# Patient Record
Sex: FEMALE | Race: OTHER | Employment: FULL TIME | URBAN - METROPOLITAN AREA
[De-identification: names, ages, dates, MRNs, and addresses within clinical notes are randomized per-mention and may not be internally consistent; named-entity substitution may affect disease eponyms.]

---

## 2023-02-27 ENCOUNTER — HOSPITAL ENCOUNTER (OUTPATIENT)
Dept: GENERAL RADIOLOGY | Age: 48
Discharge: HOME | End: 2023-03-01
Payer: COMMERCIAL

## 2023-02-27 DIAGNOSIS — N64.4 BREAST PAIN: ICD-10-CM

## 2023-02-27 PROCEDURE — 77066 DX MAMMO INCL CAD BI: CPT

## 2023-07-19 ENCOUNTER — OFFICE VISIT (OUTPATIENT)
Age: 48
End: 2023-07-19
Payer: COMMERCIAL

## 2023-07-19 VITALS
SYSTOLIC BLOOD PRESSURE: 132 MMHG | DIASTOLIC BLOOD PRESSURE: 88 MMHG | BODY MASS INDEX: 39.26 KG/M2 | HEART RATE: 81 BPM | HEIGHT: 57 IN | OXYGEN SATURATION: 97 % | WEIGHT: 182 LBS | TEMPERATURE: 97.3 F | RESPIRATION RATE: 16 BRPM

## 2023-07-19 DIAGNOSIS — Z11.4 SCREENING FOR HIV WITHOUT PRESENCE OF RISK FACTORS: Primary | ICD-10-CM

## 2023-07-19 DIAGNOSIS — Z11.59 NEED FOR HEPATITIS C SCREENING TEST: ICD-10-CM

## 2023-07-19 DIAGNOSIS — Z00.00 HEALTHCARE MAINTENANCE: ICD-10-CM

## 2023-07-19 DIAGNOSIS — M25.511 CHRONIC RIGHT SHOULDER PAIN: ICD-10-CM

## 2023-07-19 DIAGNOSIS — E78.2 MIXED HYPERLIPIDEMIA: ICD-10-CM

## 2023-07-19 DIAGNOSIS — M54.12 CERVICAL RADICULOPATHY: ICD-10-CM

## 2023-07-19 DIAGNOSIS — Z11.4 SCREENING FOR HIV WITHOUT PRESENCE OF RISK FACTORS: ICD-10-CM

## 2023-07-19 DIAGNOSIS — M65.4 DE QUERVAIN'S TENOSYNOVITIS, RIGHT: ICD-10-CM

## 2023-07-19 DIAGNOSIS — G89.29 CHRONIC RIGHT SHOULDER PAIN: ICD-10-CM

## 2023-07-19 DIAGNOSIS — I10 PRIMARY HYPERTENSION: ICD-10-CM

## 2023-07-19 LAB
ABSOLUTE IMMATURE GRANULOCYTE: 0.03 K/UL (ref 0–0.58)
ALBUMIN SERPL-MCNC: 3.9 G/DL (ref 3.5–5.2)
ALP BLD-CCNC: 95 U/L (ref 35–104)
ALT SERPL-CCNC: 7 U/L (ref 0–32)
ANION GAP SERPL CALCULATED.3IONS-SCNC: 13 MMOL/L (ref 7–16)
AST SERPL-CCNC: 14 U/L (ref 0–31)
BASOPHILS ABSOLUTE: 0.05 K/UL (ref 0–0.2)
BASOPHILS RELATIVE PERCENT: 1 % (ref 0–2)
BILIRUB SERPL-MCNC: 0.3 MG/DL (ref 0–1.2)
BUN BLDV-MCNC: 14 MG/DL (ref 6–20)
CALCIUM SERPL-MCNC: 9 MG/DL (ref 8.6–10.2)
CHLORIDE BLD-SCNC: 107 MMOL/L (ref 98–107)
CHOLESTEROL: 153 MG/DL
CO2: 23 MMOL/L (ref 22–29)
CREAT SERPL-MCNC: 0.7 MG/DL (ref 0.5–1)
EOSINOPHILS ABSOLUTE: 0.24 K/UL (ref 0.05–0.5)
EOSINOPHILS RELATIVE PERCENT: 2 % (ref 0–6)
GFR SERPL CREATININE-BSD FRML MDRD: >60 ML/MIN/1.73M2
GLUCOSE BLD-MCNC: 90 MG/DL (ref 74–99)
HCT VFR BLD CALC: 41.4 % (ref 34–48)
HDLC SERPL-MCNC: 52 MG/DL
HEMOGLOBIN: 12.5 G/DL (ref 11.5–15.5)
IMMATURE GRANULOCYTES: 0 % (ref 0–5)
LDL CHOLESTEROL: 85 MG/DL
LYMPHOCYTES ABSOLUTE: 2.79 K/UL (ref 1.5–4)
LYMPHOCYTES RELATIVE PERCENT: 27 % (ref 20–42)
MCH RBC QN AUTO: 29.5 PG (ref 26–35)
MCHC RBC AUTO-ENTMCNC: 30.2 G/DL (ref 32–34.5)
MCV RBC AUTO: 97.6 FL (ref 80–99.9)
MONOCYTES ABSOLUTE: 0.51 K/UL (ref 0.1–0.95)
MONOCYTES RELATIVE PERCENT: 5 % (ref 2–12)
NEUTROPHILS ABSOLUTE: 6.59 K/UL (ref 1.8–7.3)
NEUTROPHILS RELATIVE PERCENT: 65 % (ref 43–80)
PDW BLD-RTO: 13.2 % (ref 11.5–15)
PLATELET # BLD: 368 K/UL (ref 130–450)
PMV BLD AUTO: 11 FL (ref 7–12)
POTASSIUM SERPL-SCNC: 3.7 MMOL/L (ref 3.5–5)
RBC # BLD: 4.24 M/UL (ref 3.5–5.5)
SODIUM BLD-SCNC: 143 MMOL/L (ref 132–146)
TOTAL PROTEIN: 7.8 G/DL (ref 6.4–8.3)
TRIGL SERPL-MCNC: 79 MG/DL
VLDLC SERPL CALC-MCNC: 16 MG/DL
WBC # BLD: 10.2 K/UL (ref 4.5–11.5)

## 2023-07-19 PROCEDURE — 36415 COLL VENOUS BLD VENIPUNCTURE: CPT | Performed by: FAMILY MEDICINE

## 2023-07-19 PROCEDURE — 1036F TOBACCO NON-USER: CPT | Performed by: FAMILY MEDICINE

## 2023-07-19 PROCEDURE — 3075F SYST BP GE 130 - 139MM HG: CPT | Performed by: FAMILY MEDICINE

## 2023-07-19 PROCEDURE — G8427 DOCREV CUR MEDS BY ELIG CLIN: HCPCS | Performed by: FAMILY MEDICINE

## 2023-07-19 PROCEDURE — G8417 CALC BMI ABV UP PARAM F/U: HCPCS | Performed by: FAMILY MEDICINE

## 2023-07-19 PROCEDURE — 3079F DIAST BP 80-89 MM HG: CPT | Performed by: FAMILY MEDICINE

## 2023-07-19 PROCEDURE — 99204 OFFICE O/P NEW MOD 45 MIN: CPT | Performed by: FAMILY MEDICINE

## 2023-07-19 RX ORDER — LISINOPRIL 20 MG/1
20 TABLET ORAL DAILY
Qty: 90 TABLET | Refills: 1 | Status: SHIPPED | OUTPATIENT
Start: 2023-07-19

## 2023-07-19 RX ORDER — AMLODIPINE BESYLATE 2.5 MG/1
2.5 TABLET ORAL DAILY
COMMUNITY
Start: 2023-07-13

## 2023-07-19 RX ORDER — BUDESONIDE AND FORMOTEROL FUMARATE DIHYDRATE 160; 4.5 UG/1; UG/1
AEROSOL RESPIRATORY (INHALATION)
COMMUNITY
Start: 2023-07-14

## 2023-07-19 RX ORDER — ERGOCALCIFEROL 1.25 MG/1
50000 CAPSULE ORAL WEEKLY
COMMUNITY
Start: 2023-06-15

## 2023-07-19 RX ORDER — LISINOPRIL 20 MG/1
20 TABLET ORAL DAILY
COMMUNITY
Start: 2023-04-30 | End: 2023-07-19 | Stop reason: SDUPTHER

## 2023-07-19 SDOH — ECONOMIC STABILITY: FOOD INSECURITY: WITHIN THE PAST 12 MONTHS, THE FOOD YOU BOUGHT JUST DIDN'T LAST AND YOU DIDN'T HAVE MONEY TO GET MORE.: NEVER TRUE

## 2023-07-19 SDOH — ECONOMIC STABILITY: FOOD INSECURITY: WITHIN THE PAST 12 MONTHS, YOU WORRIED THAT YOUR FOOD WOULD RUN OUT BEFORE YOU GOT MONEY TO BUY MORE.: NEVER TRUE

## 2023-07-19 SDOH — ECONOMIC STABILITY: INCOME INSECURITY: HOW HARD IS IT FOR YOU TO PAY FOR THE VERY BASICS LIKE FOOD, HOUSING, MEDICAL CARE, AND HEATING?: NOT VERY HARD

## 2023-07-19 SDOH — ECONOMIC STABILITY: HOUSING INSECURITY
IN THE LAST 12 MONTHS, WAS THERE A TIME WHEN YOU DID NOT HAVE A STEADY PLACE TO SLEEP OR SLEPT IN A SHELTER (INCLUDING NOW)?: NO

## 2023-07-19 ASSESSMENT — ENCOUNTER SYMPTOMS
COUGH: 0
ABDOMINAL PAIN: 0
WHEEZING: 0
DIARRHEA: 0
VOMITING: 0
NAUSEA: 0
SHORTNESS OF BREATH: 0
CONSTIPATION: 0

## 2023-07-19 ASSESSMENT — PATIENT HEALTH QUESTIONNAIRE - PHQ9
SUM OF ALL RESPONSES TO PHQ QUESTIONS 1-9: 0
2. FEELING DOWN, DEPRESSED OR HOPELESS: 0
1. LITTLE INTEREST OR PLEASURE IN DOING THINGS: 0
SUM OF ALL RESPONSES TO PHQ QUESTIONS 1-9: 0
SUM OF ALL RESPONSES TO PHQ9 QUESTIONS 1 & 2: 0

## 2023-07-19 NOTE — PROGRESS NOTES
[facility] Primary Care  DATE OF VISIT : 2023    Patient : Jacque Bagley   Age : 52 y.o.  : 1975   MRN : [id number]   ______________________________________________________________________    Chief Complaint :   Chief Complaint   Patient presents with    New Patient     Patient presents today as a new patient. Nodule     Patient presents with a nodule on her right wrist that she states she has been dealing with since [place]. HPI : Marcelino Spaulding is 52 y.o. female who presented to the clinic today for new patient encounter. HTN: Lisinopril 20mg daily. Was given Norvasc 2.5 before for an elevated diastolic BP, she had an episode of hypotension with palpitations. Has not taken the Norvasc since. Asthma: Symbicort 160-4.5mg daily, albuterol as needed. Using her albuterol less than once monthly. Gastritis:  Protinix 40mg daily. EGD done by GI. Evonne tenosynovitis: Has been ongoing for years. Importantly because she had done some physical therapy with some relief. They had discussed injections in the past but then she moved. She does worked in maintenance for many years and now works at a [employer] using her wrists consistently. Right shoulder pain: She had received injections of her right shoulder previously. Feels intermittently get pain with decreased range of motion. I reviewed the patient's past medications, allergies and past medical history during this visit. Past Medical History :    Health Maintenance-   Colonoscopy- cologuard done 2yrs ago. Ob/Gyn:  Menarche- 6yo  LMP/Menopause- hysterectomy- cervix status unknown. Removed from adherences over 15yrs  Last PAP smear- unknown   Ob Hx-   Mammogram- 2mos ago      Past Medical History:   Diagnosis Date    Anxiety     Asthma     Hypertension      No past surgical history on file.     Social History :  Social History       Tobacco History       Smoking Status  Never

## 2023-07-20 ENCOUNTER — TELEPHONE (OUTPATIENT)
Age: 48
End: 2023-07-20

## 2023-07-20 DIAGNOSIS — M54.12 CERVICAL RADICULOPATHY: ICD-10-CM

## 2023-07-20 DIAGNOSIS — I10 PRIMARY HYPERTENSION: Primary | ICD-10-CM

## 2023-07-20 DIAGNOSIS — M65.4 DE QUERVAIN'S TENOSYNOVITIS, RIGHT: ICD-10-CM

## 2023-07-20 LAB
HEPATITIS C ANTIBODY: NONREACTIVE
HIV AG/AB: NONREACTIVE

## 2023-07-20 RX ORDER — BLOOD PRESSURE TEST KIT
1 KIT MISCELLANEOUS DAILY
Qty: 1 KIT | Refills: 0 | Status: SHIPPED | OUTPATIENT
Start: 2023-07-20

## 2023-07-20 RX ORDER — CUSHION
1 EACH MISCELLANEOUS DAILY
Qty: 1 EACH | Refills: 0 | Status: SHIPPED | OUTPATIENT
Start: 2023-07-20

## 2023-07-20 NOTE — TELEPHONE ENCOUNTER
Patient called asking for written orders for a blood pressure cuff, a right wrist brace, and an order for some sort of neck pillow that you guys discussed yesterday at her visit. Found a place that will cover all three for her and will be picking up orders when they have been written and printed.     Thanks :)

## 2023-08-03 ENCOUNTER — OFFICE VISIT (OUTPATIENT)
Age: 48
End: 2023-08-03
Payer: COMMERCIAL

## 2023-08-03 VITALS
WEIGHT: 184 LBS | HEIGHT: 57 IN | BODY MASS INDEX: 39.7 KG/M2 | HEART RATE: 91 BPM | TEMPERATURE: 97.9 F | RESPIRATION RATE: 16 BRPM | DIASTOLIC BLOOD PRESSURE: 84 MMHG | SYSTOLIC BLOOD PRESSURE: 116 MMHG | OXYGEN SATURATION: 97 %

## 2023-08-03 DIAGNOSIS — Z01.419 WELL WOMAN EXAM WITH ROUTINE GYNECOLOGICAL EXAM: Primary | ICD-10-CM

## 2023-08-03 DIAGNOSIS — N64.4 BREAST PAIN, LEFT: ICD-10-CM

## 2023-08-03 PROCEDURE — 99396 PREV VISIT EST AGE 40-64: CPT | Performed by: FAMILY MEDICINE

## 2023-08-03 ASSESSMENT — ENCOUNTER SYMPTOMS: ABDOMINAL PAIN: 0

## 2023-08-08 LAB
GYNECOLOGY CYTOLOGY REPORT: NORMAL
HPV SAMPLE: NORMAL
HPV SOURCE: NORMAL
HPV, GENOTYPE 16: NOT DETECTED
HPV, GENOTYPE 18: NOT DETECTED
HPV, HIGH RISK OTHER: NOT DETECTED
HPV, INTERPRETATION: NORMAL

## 2023-08-31 ENCOUNTER — HOSPITAL ENCOUNTER (OUTPATIENT)
Dept: GENERAL RADIOLOGY | Age: 48
Discharge: HOME | End: 2023-09-02
Payer: COMMERCIAL

## 2023-08-31 DIAGNOSIS — N64.4 BREAST PAIN, LEFT: ICD-10-CM

## 2023-08-31 PROCEDURE — G0279 TOMOSYNTHESIS, MAMMO: HCPCS

## 2023-08-31 PROCEDURE — 77065 DX MAMMO INCL CAD UNI: CPT

## 2023-09-19 RX ORDER — ALBUTEROL SULFATE 90 UG/1
2 AEROSOL, METERED RESPIRATORY (INHALATION) EVERY 6 HOURS PRN
Qty: 18 G | Refills: 3 | Status: SHIPPED | OUTPATIENT
Start: 2023-09-19

## 2023-10-09 DIAGNOSIS — I10 PRIMARY HYPERTENSION: ICD-10-CM

## 2023-10-09 NOTE — TELEPHONE ENCOUNTER
Patient also stated that she cannot make it to her appointment Tuesday, 10/10/23 due to transportation. But she would like to still know her results for her Mammogram and wants the doctor to know that she is still having discomfort and pain. She states she would like to know what she can do.

## 2023-10-10 RX ORDER — PANTOPRAZOLE SODIUM 40 MG/1
40 TABLET, DELAYED RELEASE ORAL DAILY
Qty: 30 TABLET | Refills: 1 | Status: SHIPPED | OUTPATIENT
Start: 2023-10-10

## 2023-10-10 RX ORDER — ALBUTEROL SULFATE 90 UG/1
2 AEROSOL, METERED RESPIRATORY (INHALATION) EVERY 6 HOURS PRN
Qty: 18 G | Refills: 3 | Status: SHIPPED | OUTPATIENT
Start: 2023-10-10

## 2023-10-10 RX ORDER — BUDESONIDE AND FORMOTEROL FUMARATE DIHYDRATE 160; 4.5 UG/1; UG/1
AEROSOL RESPIRATORY (INHALATION)
Qty: 10.2 G | Refills: 1 | Status: SHIPPED | OUTPATIENT
Start: 2023-10-10

## 2023-10-10 RX ORDER — LISINOPRIL 20 MG/1
20 TABLET ORAL DAILY
Qty: 90 TABLET | Refills: 1 | Status: SHIPPED | OUTPATIENT
Start: 2023-10-10

## 2023-11-07 ENCOUNTER — HOSPITAL ENCOUNTER (EMERGENCY)
Age: 48
Discharge: HOME | End: 2023-11-07
Attending: EMERGENCY MEDICINE

## 2023-11-07 VITALS
OXYGEN SATURATION: 97 % | TEMPERATURE: 99.9 F | DIASTOLIC BLOOD PRESSURE: 82 MMHG | HEART RATE: 98 BPM | RESPIRATION RATE: 18 BRPM | SYSTOLIC BLOOD PRESSURE: 132 MMHG | HEIGHT: 57 IN | BODY MASS INDEX: 34.52 KG/M2 | WEIGHT: 160 LBS

## 2023-11-07 DIAGNOSIS — M54.31 BACK PAIN WITH RIGHT-SIDED SCIATICA: ICD-10-CM

## 2023-11-07 DIAGNOSIS — U07.1 COVID-19: Primary | ICD-10-CM

## 2023-11-07 LAB
INFLUENZA A BY PCR: NOT DETECTED
INFLUENZA B BY PCR: NOT DETECTED
SARS-COV-2 RDRP RESP QL NAA+PROBE: DETECTED
SPECIMEN DESCRIPTION: ABNORMAL

## 2023-11-07 PROCEDURE — 87635 SARS-COV-2 COVID-19 AMP PRB: CPT

## 2023-11-07 PROCEDURE — 87502 INFLUENZA DNA AMP PROBE: CPT

## 2023-11-07 PROCEDURE — 96372 THER/PROPH/DIAG INJ SC/IM: CPT

## 2023-11-07 PROCEDURE — 6360000002 HC RX W HCPCS: Performed by: EMERGENCY MEDICINE

## 2023-11-07 PROCEDURE — 99284 EMERGENCY DEPT VISIT MOD MDM: CPT

## 2023-11-07 RX ORDER — DEXTROMETHORPHAN HYDROBROMIDE, GUAIFENESIN 5; 100 MG/5ML; MG/5ML
20 LIQUID ORAL EVERY 4 HOURS PRN
Qty: 1 EACH | Refills: 0 | Status: SHIPPED | OUTPATIENT
Start: 2023-11-07 | End: 2023-11-14

## 2023-11-07 RX ORDER — HYDROCODONE BITARTRATE AND ACETAMINOPHEN 5; 325 MG/1; MG/1
2 TABLET ORAL ONCE
Status: DISCONTINUED | OUTPATIENT
Start: 2023-11-07 | End: 2023-11-07 | Stop reason: HOSPADM

## 2023-11-07 RX ORDER — LIDOCAINE 50 MG/G
1 PATCH TOPICAL DAILY
Qty: 10 PATCH | Refills: 0 | Status: SHIPPED | OUTPATIENT
Start: 2023-11-07 | End: 2023-11-17

## 2023-11-07 RX ORDER — KETOROLAC TROMETHAMINE 30 MG/ML
30 INJECTION, SOLUTION INTRAMUSCULAR; INTRAVENOUS ONCE
Status: COMPLETED | OUTPATIENT
Start: 2023-11-07 | End: 2023-11-07

## 2023-11-07 RX ORDER — DEXAMETHASONE SODIUM PHOSPHATE 4 MG/ML
4 INJECTION, SOLUTION INTRA-ARTICULAR; INTRALESIONAL; INTRAMUSCULAR; INTRAVENOUS; SOFT TISSUE ONCE
Status: COMPLETED | OUTPATIENT
Start: 2023-11-07 | End: 2023-11-07

## 2023-11-07 RX ORDER — BENZONATATE 100 MG/1
100 CAPSULE ORAL 3 TIMES DAILY PRN
Qty: 20 CAPSULE | Refills: 0 | Status: SHIPPED | OUTPATIENT
Start: 2023-11-07 | End: 2023-11-14

## 2023-11-07 RX ORDER — FENTANYL CITRATE 50 UG/ML
100 INJECTION, SOLUTION INTRAMUSCULAR; INTRAVENOUS ONCE
Status: COMPLETED | OUTPATIENT
Start: 2023-11-07 | End: 2023-11-07

## 2023-11-07 RX ADMIN — KETOROLAC TROMETHAMINE 30 MG: 30 INJECTION, SOLUTION INTRAMUSCULAR; INTRAVENOUS at 11:17

## 2023-11-07 RX ADMIN — FENTANYL CITRATE 100 MCG: 50 INJECTION, SOLUTION INTRAMUSCULAR; INTRAVENOUS at 11:18

## 2023-11-07 RX ADMIN — DEXAMETHASONE SODIUM PHOSPHATE 4 MG: 4 INJECTION, SOLUTION INTRAMUSCULAR; INTRAVENOUS at 11:17

## 2023-11-07 ASSESSMENT — PAIN DESCRIPTION - LOCATION
LOCATION: FLANK
LOCATION: FLANK
LOCATION: BACK

## 2023-11-07 ASSESSMENT — PAIN SCALES - GENERAL
PAINLEVEL_OUTOF10: 10

## 2023-11-07 ASSESSMENT — LIFESTYLE VARIABLES
HOW OFTEN DO YOU HAVE A DRINK CONTAINING ALCOHOL: NEVER
HOW MANY STANDARD DRINKS CONTAINING ALCOHOL DO YOU HAVE ON A TYPICAL DAY: PATIENT DOES NOT DRINK

## 2023-11-07 ASSESSMENT — PAIN - FUNCTIONAL ASSESSMENT: PAIN_FUNCTIONAL_ASSESSMENT: 0-10

## 2023-11-07 NOTE — ED NOTES
Discharge instructions given. Patient verbalizes understanding. No other noted or stated problems at this time. Patient will follow up with primary care.       Mello Flores RN  11/07/23 0166

## 2023-11-29 ENCOUNTER — HOSPITAL ENCOUNTER (EMERGENCY)
Age: 48
Discharge: HOME | End: 2023-11-29

## 2023-11-29 ENCOUNTER — APPOINTMENT (OUTPATIENT)
Dept: GENERAL RADIOLOGY | Age: 48
End: 2023-11-29

## 2023-11-29 ENCOUNTER — APPOINTMENT (OUTPATIENT)
Dept: CT IMAGING | Age: 48
End: 2023-11-29

## 2023-11-29 VITALS
DIASTOLIC BLOOD PRESSURE: 70 MMHG | SYSTOLIC BLOOD PRESSURE: 155 MMHG | WEIGHT: 183 LBS | HEIGHT: 57 IN | OXYGEN SATURATION: 100 % | TEMPERATURE: 98.1 F | RESPIRATION RATE: 14 BRPM | HEART RATE: 86 BPM | BODY MASS INDEX: 39.48 KG/M2

## 2023-11-29 DIAGNOSIS — R03.0 ELEVATED BLOOD PRESSURE READING: ICD-10-CM

## 2023-11-29 DIAGNOSIS — R51.9 NONINTRACTABLE HEADACHE, UNSPECIFIED CHRONICITY PATTERN, UNSPECIFIED HEADACHE TYPE: Primary | ICD-10-CM

## 2023-11-29 LAB
ALBUMIN SERPL-MCNC: 4.3 G/DL (ref 3.5–5.2)
ALP SERPL-CCNC: 96 U/L (ref 35–104)
ALT SERPL-CCNC: 18 U/L (ref 0–32)
ANION GAP SERPL CALCULATED.3IONS-SCNC: 10 MMOL/L (ref 7–16)
AST SERPL-CCNC: 15 U/L (ref 0–31)
BASOPHILS # BLD: 0.04 K/UL (ref 0–0.2)
BASOPHILS NFR BLD: 0 % (ref 0–2)
BILIRUB SERPL-MCNC: 0.3 MG/DL (ref 0–1.2)
BUN SERPL-MCNC: 24 MG/DL (ref 6–20)
CALCIUM SERPL-MCNC: 10 MG/DL (ref 8.6–10.2)
CHLORIDE SERPL-SCNC: 102 MMOL/L (ref 98–107)
CO2 SERPL-SCNC: 28 MMOL/L (ref 22–29)
CREAT SERPL-MCNC: 0.7 MG/DL (ref 0.5–1)
EOSINOPHIL # BLD: 0.24 K/UL (ref 0.05–0.5)
EOSINOPHILS RELATIVE PERCENT: 2 % (ref 0–6)
ERYTHROCYTE [DISTWIDTH] IN BLOOD BY AUTOMATED COUNT: 13.5 % (ref 11.5–15)
GFR SERPL CREATININE-BSD FRML MDRD: >60 ML/MIN/1.73M2
GLUCOSE SERPL-MCNC: 104 MG/DL (ref 74–99)
HCT VFR BLD AUTO: 40 % (ref 34–48)
HGB BLD-MCNC: 12.9 G/DL (ref 11.5–15.5)
IMM GRANULOCYTES # BLD AUTO: 0.05 K/UL (ref 0–0.58)
IMM GRANULOCYTES NFR BLD: 0 % (ref 0–5)
LYMPHOCYTES NFR BLD: 3.62 K/UL (ref 1.5–4)
LYMPHOCYTES RELATIVE PERCENT: 30 % (ref 20–42)
MCH RBC QN AUTO: 29.3 PG (ref 26–35)
MCHC RBC AUTO-ENTMCNC: 32.3 G/DL (ref 32–34.5)
MCV RBC AUTO: 90.9 FL (ref 80–99.9)
MONOCYTES NFR BLD: 0.66 K/UL (ref 0.1–0.95)
MONOCYTES NFR BLD: 6 % (ref 2–12)
NEUTROPHILS NFR BLD: 62 % (ref 43–80)
NEUTS SEG NFR BLD: 7.43 K/UL (ref 1.8–7.3)
PLATELET # BLD AUTO: 388 K/UL (ref 130–450)
PMV BLD AUTO: 10.6 FL (ref 7–12)
POTASSIUM SERPL-SCNC: 3.6 MMOL/L (ref 3.5–5)
PROT SERPL-MCNC: 8.4 G/DL (ref 6.4–8.3)
RBC # BLD AUTO: 4.4 M/UL (ref 3.5–5.5)
SODIUM SERPL-SCNC: 140 MMOL/L (ref 132–146)
TROPONIN I SERPL HS-MCNC: <6 NG/L (ref 0–9)
WBC OTHER # BLD: 12 K/UL (ref 4.5–11.5)

## 2023-11-29 PROCEDURE — 84484 ASSAY OF TROPONIN QUANT: CPT

## 2023-11-29 PROCEDURE — 93005 ELECTROCARDIOGRAM TRACING: CPT | Performed by: NURSE PRACTITIONER

## 2023-11-29 PROCEDURE — 2580000003 HC RX 258: Performed by: NURSE PRACTITIONER

## 2023-11-29 PROCEDURE — 96374 THER/PROPH/DIAG INJ IV PUSH: CPT

## 2023-11-29 PROCEDURE — 80053 COMPREHEN METABOLIC PANEL: CPT

## 2023-11-29 PROCEDURE — 96375 TX/PRO/DX INJ NEW DRUG ADDON: CPT

## 2023-11-29 PROCEDURE — 71046 X-RAY EXAM CHEST 2 VIEWS: CPT

## 2023-11-29 PROCEDURE — 6370000000 HC RX 637 (ALT 250 FOR IP): Performed by: NURSE PRACTITIONER

## 2023-11-29 PROCEDURE — 6360000002 HC RX W HCPCS: Performed by: NURSE PRACTITIONER

## 2023-11-29 PROCEDURE — 85025 COMPLETE CBC W/AUTO DIFF WBC: CPT

## 2023-11-29 PROCEDURE — 70450 CT HEAD/BRAIN W/O DYE: CPT

## 2023-11-29 PROCEDURE — 99285 EMERGENCY DEPT VISIT HI MDM: CPT

## 2023-11-29 RX ORDER — DIPHENHYDRAMINE HCL 25 MG
25 TABLET ORAL EVERY 6 HOURS PRN
Qty: 12 TABLET | Refills: 0 | Status: SHIPPED | OUTPATIENT
Start: 2023-11-29 | End: 2023-12-11

## 2023-11-29 RX ORDER — ACETAMINOPHEN 500 MG
1000 TABLET ORAL ONCE
Status: COMPLETED | OUTPATIENT
Start: 2023-11-29 | End: 2023-11-29

## 2023-11-29 RX ORDER — 0.9 % SODIUM CHLORIDE 0.9 %
1000 INTRAVENOUS SOLUTION INTRAVENOUS ONCE
Status: COMPLETED | OUTPATIENT
Start: 2023-11-29 | End: 2023-11-29

## 2023-11-29 RX ORDER — IBUPROFEN 600 MG/1
600 TABLET ORAL EVERY 6 HOURS PRN
Qty: 20 TABLET | Refills: 0 | Status: SHIPPED | OUTPATIENT
Start: 2023-11-29 | End: 2023-12-04

## 2023-11-29 RX ORDER — KETOROLAC TROMETHAMINE 30 MG/ML
15 INJECTION, SOLUTION INTRAMUSCULAR; INTRAVENOUS ONCE
Status: COMPLETED | OUTPATIENT
Start: 2023-11-29 | End: 2023-11-29

## 2023-11-29 RX ORDER — DIPHENHYDRAMINE HYDROCHLORIDE 50 MG/ML
25 INJECTION INTRAMUSCULAR; INTRAVENOUS ONCE
Status: COMPLETED | OUTPATIENT
Start: 2023-11-29 | End: 2023-11-29

## 2023-11-29 RX ADMIN — ACETAMINOPHEN 1000 MG: 500 TABLET ORAL at 21:07

## 2023-11-29 RX ADMIN — KETOROLAC TROMETHAMINE 15 MG: 30 INJECTION, SOLUTION INTRAMUSCULAR; INTRAVENOUS at 21:05

## 2023-11-29 RX ADMIN — DIPHENHYDRAMINE HYDROCHLORIDE 25 MG: 50 INJECTION INTRAMUSCULAR; INTRAVENOUS at 21:06

## 2023-11-29 RX ADMIN — SODIUM CHLORIDE 1000 ML: 9 INJECTION, SOLUTION INTRAVENOUS at 21:03

## 2023-11-29 ASSESSMENT — PAIN SCALES - GENERAL
PAINLEVEL_OUTOF10: 10
PAINLEVEL_OUTOF10: 8

## 2023-11-29 ASSESSMENT — PAIN DESCRIPTION - LOCATION
LOCATION: HEAD
LOCATION: HEAD

## 2023-11-29 ASSESSMENT — PAIN DESCRIPTION - PAIN TYPE: TYPE: ACUTE PAIN

## 2023-11-29 ASSESSMENT — PAIN - FUNCTIONAL ASSESSMENT: PAIN_FUNCTIONAL_ASSESSMENT: 0-10

## 2023-11-29 ASSESSMENT — PAIN DESCRIPTION - ONSET: ONSET: ON-GOING

## 2023-11-29 ASSESSMENT — PAIN DESCRIPTION - ORIENTATION
ORIENTATION: POSTERIOR
ORIENTATION: POSTERIOR

## 2023-11-29 ASSESSMENT — PAIN DESCRIPTION - FREQUENCY: FREQUENCY: CONTINUOUS

## 2023-11-29 ASSESSMENT — PAIN DESCRIPTION - DESCRIPTORS
DESCRIPTORS: DISCOMFORT
DESCRIPTORS: SHARP

## 2023-11-29 NOTE — ED NOTES
Department of Emergency Medicine  FIRST PROVIDER TRIAGE NOTE             Independent MLP           11/29/23  4:16 PM EST    Date of Encounter: 11/29/23   MRN: [id number]      HPI: Rody Us is a 50 y.o. female who presents to the ED for Hypertension     3 DAYS OF HEADACHE AND POSTERIOR HEAD PAIN. BP AT WORK /104. HAVING VISION CHANGES, AT TIMES DIFFICULTY OPENING EYES.      ON BP MEDICATION LISINOPRIL.      ROS: Negative for abd pain or back pain. PE: Gen Appearance/Constitutional: alert  HEENT: NC/NT. PERRLA,  Airway patent. Initial Plan of Care: All treatment areas with department are currently occupied. Plan to order/Initiate the following while awaiting opening in ED: labs, EKG, and imaging studies.   Initiate Treatment-Testing, Proceed toTreatment Area When Bed Available for ED Attending/MLP to Continue Care    Electronically signed by ALEX Dumas CNP   DD: 11/29/23      ALEX Dumas CNP  11/29/23 4534

## 2023-11-30 LAB
EKG ATRIAL RATE: 78 BPM
EKG P AXIS: 54 DEGREES
EKG P-R INTERVAL: 122 MS
EKG Q-T INTERVAL: 396 MS
EKG QRS DURATION: 84 MS
EKG QTC CALCULATION (BAZETT): 451 MS
EKG R AXIS: 13 DEGREES
EKG T AXIS: 8 DEGREES
EKG VENTRICULAR RATE: 78 BPM

## 2023-11-30 PROCEDURE — 93010 ELECTROCARDIOGRAM REPORT: CPT | Performed by: INTERNAL MEDICINE

## 2023-11-30 NOTE — ED PROVIDER NOTES
Patient was given Benadryl and Motrin for home and a work excuse. Patient was given Tylenol, Benadryl, Toradol and normal saline IV fluids  for their symptoms with moderate improvement. Patient continues to be non-toxic on re-evaluation. She has no meningeal signs. She has a history of having headaches that have been daily for the past several months and we will give her neurology to follow-up with to evaluate her daily headaches may need MRI in the future if headaches persist.  Patient instructed to keep a diary of headaches. Also instructed on decrease sodium diet and to monitor her blood pressures once in the morning once at night and take recordings with her to her PCP. She has no focal neurologic deficits on examination. She will be given Benadryl  and motrin prescriptions for discharge. Findings were discussed with the patient and reasons to immediately return to the ED were articulated to them. They will follow-up with their PMD and neurology. History from : Patient    Limitations to history : Language Czech speaking    Discussion with Other Professionals : None    Social Determinants Significantly Affecting Health : None        Plan of Care/Counseling:  ALEX Barr CNP reviewed today's visit with the patient in addition to providing specific details for the plan of care and counseling regarding the diagnosis and prognosis. Questions are answered at this time and are agreeable with the plan. Assessment      1. Nonintractable headache, unspecified chronicity pattern, unspecified headache type    2. Elevated blood pressure reading      Plan   Discharged home.   Patient condition is good    New Medications     Discharge Medication List as of 11/29/2023 10:22 PM        START taking these medications    Details   diphenhydrAMINE (BENADRYL) 25 MG tablet Take 1 tablet by mouth every 6 hours as needed (headache), Disp-12 tablet, R-0Normal      ibuprofen (ADVIL;MOTRIN) 600 MG tablet Take

## 2023-12-12 DIAGNOSIS — I10 PRIMARY HYPERTENSION: ICD-10-CM

## 2023-12-13 RX ORDER — ALBUTEROL SULFATE 90 UG/1
2 AEROSOL, METERED RESPIRATORY (INHALATION) EVERY 6 HOURS PRN
Qty: 18 G | Refills: 3 | Status: SHIPPED | OUTPATIENT
Start: 2023-12-13

## 2023-12-13 RX ORDER — AMLODIPINE BESYLATE 2.5 MG/1
2.5 TABLET ORAL DAILY
Qty: 90 TABLET | Refills: 1 | Status: SHIPPED | OUTPATIENT
Start: 2023-12-13

## 2023-12-13 RX ORDER — BUDESONIDE AND FORMOTEROL FUMARATE DIHYDRATE 160; 4.5 UG/1; UG/1
AEROSOL RESPIRATORY (INHALATION)
Qty: 10.2 G | Refills: 1 | Status: SHIPPED | OUTPATIENT
Start: 2023-12-13

## 2023-12-13 RX ORDER — PANTOPRAZOLE SODIUM 40 MG/1
40 TABLET, DELAYED RELEASE ORAL DAILY
Qty: 30 TABLET | Refills: 1 | Status: SHIPPED | OUTPATIENT
Start: 2023-12-13

## 2023-12-13 RX ORDER — LISINOPRIL 20 MG/1
20 TABLET ORAL DAILY
Qty: 90 TABLET | Refills: 1 | Status: SHIPPED | OUTPATIENT
Start: 2023-12-13

## 2024-01-11 ENCOUNTER — OFFICE VISIT (OUTPATIENT)
Age: 49
End: 2024-01-11
Payer: COMMERCIAL

## 2024-01-11 VITALS
RESPIRATION RATE: 16 BRPM | DIASTOLIC BLOOD PRESSURE: 74 MMHG | HEART RATE: 62 BPM | BODY MASS INDEX: 39.25 KG/M2 | SYSTOLIC BLOOD PRESSURE: 132 MMHG | HEIGHT: 58 IN | OXYGEN SATURATION: 100 % | TEMPERATURE: 97.9 F | WEIGHT: 187 LBS

## 2024-01-11 DIAGNOSIS — G89.29 CHRONIC RIGHT SHOULDER PAIN: Primary | ICD-10-CM

## 2024-01-11 DIAGNOSIS — K21.9 GASTROESOPHAGEAL REFLUX DISEASE, UNSPECIFIED WHETHER ESOPHAGITIS PRESENT: ICD-10-CM

## 2024-01-11 DIAGNOSIS — E55.9 VITAMIN D DEFICIENCY: ICD-10-CM

## 2024-01-11 DIAGNOSIS — I10 PRIMARY HYPERTENSION: ICD-10-CM

## 2024-01-11 DIAGNOSIS — J45.20 MILD INTERMITTENT ASTHMA WITHOUT COMPLICATION: ICD-10-CM

## 2024-01-11 DIAGNOSIS — M54.12 CERVICAL RADICULOPATHY: ICD-10-CM

## 2024-01-11 DIAGNOSIS — M25.511 CHRONIC RIGHT SHOULDER PAIN: Primary | ICD-10-CM

## 2024-01-11 PROCEDURE — G8417 CALC BMI ABV UP PARAM F/U: HCPCS | Performed by: FAMILY MEDICINE

## 2024-01-11 PROCEDURE — 3075F SYST BP GE 130 - 139MM HG: CPT | Performed by: FAMILY MEDICINE

## 2024-01-11 PROCEDURE — 3078F DIAST BP <80 MM HG: CPT | Performed by: FAMILY MEDICINE

## 2024-01-11 PROCEDURE — G8484 FLU IMMUNIZE NO ADMIN: HCPCS | Performed by: FAMILY MEDICINE

## 2024-01-11 PROCEDURE — G8427 DOCREV CUR MEDS BY ELIG CLIN: HCPCS | Performed by: FAMILY MEDICINE

## 2024-01-11 PROCEDURE — 99214 OFFICE O/P EST MOD 30 MIN: CPT | Performed by: FAMILY MEDICINE

## 2024-01-11 PROCEDURE — 1036F TOBACCO NON-USER: CPT | Performed by: FAMILY MEDICINE

## 2024-01-11 RX ORDER — DIPHENHYDRAMINE HYDROCHLORIDE 25 MG/1
CAPSULE ORAL
COMMUNITY
Start: 2023-11-30

## 2024-01-11 RX ORDER — LISINOPRIL 20 MG/1
20 TABLET ORAL DAILY
Qty: 90 TABLET | Refills: 1 | Status: SHIPPED | OUTPATIENT
Start: 2024-01-11

## 2024-01-11 RX ORDER — PANTOPRAZOLE SODIUM 40 MG/1
40 TABLET, DELAYED RELEASE ORAL DAILY
Qty: 90 TABLET | Refills: 1 | Status: SHIPPED | OUTPATIENT
Start: 2024-01-11

## 2024-01-11 RX ORDER — ALBUTEROL SULFATE 2.5 MG/3ML
2.5 SOLUTION RESPIRATORY (INHALATION) EVERY 6 HOURS PRN
Qty: 120 EACH | Refills: 3 | Status: SHIPPED | OUTPATIENT
Start: 2024-01-11 | End: 2024-02-10

## 2024-01-11 RX ORDER — AMLODIPINE BESYLATE 2.5 MG/1
2.5 TABLET ORAL DAILY
Qty: 90 TABLET | Refills: 1 | Status: CANCELLED | OUTPATIENT
Start: 2024-01-11

## 2024-01-11 RX ORDER — ERGOCALCIFEROL 1.25 MG/1
50000 CAPSULE ORAL WEEKLY
Qty: 5 CAPSULE | Refills: 1 | Status: SHIPPED | OUTPATIENT
Start: 2024-01-11

## 2024-01-11 RX ORDER — ALBUTEROL SULFATE 90 UG/1
2 AEROSOL, METERED RESPIRATORY (INHALATION) EVERY 6 HOURS PRN
Qty: 18 G | Refills: 3 | Status: SHIPPED | OUTPATIENT
Start: 2024-01-11

## 2024-01-11 RX ORDER — BUDESONIDE AND FORMOTEROL FUMARATE DIHYDRATE 160; 4.5 UG/1; UG/1
AEROSOL RESPIRATORY (INHALATION)
Qty: 10.2 G | Refills: 1 | Status: SHIPPED | OUTPATIENT
Start: 2024-01-11

## 2024-01-11 ASSESSMENT — ENCOUNTER SYMPTOMS
NAUSEA: 0
WHEEZING: 0
SHORTNESS OF BREATH: 0
CONSTIPATION: 0
ABDOMINAL PAIN: 0
DIARRHEA: 0
VOMITING: 0
COUGH: 0

## 2024-01-11 ASSESSMENT — PATIENT HEALTH QUESTIONNAIRE - PHQ9
SUM OF ALL RESPONSES TO PHQ QUESTIONS 1-9: 0
1. LITTLE INTEREST OR PLEASURE IN DOING THINGS: 0
SUM OF ALL RESPONSES TO PHQ9 QUESTIONS 1 & 2: 0
SUM OF ALL RESPONSES TO PHQ QUESTIONS 1-9: 0
SUM OF ALL RESPONSES TO PHQ QUESTIONS 1-9: 0
2. FEELING DOWN, DEPRESSED OR HOPELESS: 0
SUM OF ALL RESPONSES TO PHQ QUESTIONS 1-9: 0

## 2024-01-11 NOTE — PROGRESS NOTES
[facility] Primary Care  DATE OF VISIT : 2024    Patient : Washington Bagley   Age : 48 y.o.    : 1975   MRN : [id number]   ______________________________________________________________________    Chief Complaint :   Chief Complaint   Patient presents with    Shoulder Pain     Patient presents today with right shoulder pain.     Back Pain     Patient was in the hospital on 23 with c/o having sciatic pain. She presents today for a follow up appt.        HPI : Washington Bagley is 48 y.o. female who presented to the clinic today for office visit.     HTN: Lisinopril 20mg daily.     Asthma: Symbicort 160-4.5mg daily, albuterol as needed. Using her albuterol less than once monthly.      Gastritis:  Protinix 40mg daily. EGD done by GI    Migraine headaches: having migraine headaches on average 2-3 times per week.Has been doing Excedrin OTC with relief.      Chronic Right shoulder pain: worsening. Decreased ROM. Had previously had shoulder injections x2 about 3yrs ago by ortho. Was told she had to get MRI done in order to continue with further care but patient couldn't do closed MRI due to anxiety and claustrophobia. She was given Valium for procedure but did not tolerate.       I reviewed the patient's past medications, allergies and past medical history during this visit.    Past Medical History :    Past Medical History:   Diagnosis Date    Anxiety     Asthma     Hypertension      Past Surgical History:   Procedure Laterality Date    HYSTERECTOMY, VAGINAL         Social History :  Social History       Tobacco History       Smoking Status  Never      Smokeless Tobacco Use  Never              Alcohol History       Alcohol Use Status  Not Asked              Drug Use       Drug Use Status  Not Asked              Sexual Activity       Sexually Active  Not Asked                     Allergies :   Allergies   Allergen Reactions    Shellfish-Derived Products        Medication List :    Current

## 2024-01-22 ENCOUNTER — HOSPITAL ENCOUNTER (OUTPATIENT)
Age: 49
Discharge: HOME | End: 2024-01-24
Payer: COMMERCIAL

## 2024-01-22 ENCOUNTER — HOSPITAL ENCOUNTER (OUTPATIENT)
Dept: GENERAL RADIOLOGY | Age: 49
Discharge: HOME | End: 2024-01-24
Payer: COMMERCIAL

## 2024-01-22 DIAGNOSIS — M54.12 CERVICAL RADICULOPATHY: ICD-10-CM

## 2024-01-22 DIAGNOSIS — G89.29 CHRONIC RIGHT SHOULDER PAIN: ICD-10-CM

## 2024-01-22 DIAGNOSIS — M25.511 CHRONIC RIGHT SHOULDER PAIN: ICD-10-CM

## 2024-01-22 PROCEDURE — 72050 X-RAY EXAM NECK SPINE 4/5VWS: CPT

## 2024-01-22 PROCEDURE — 73030 X-RAY EXAM OF SHOULDER: CPT

## 2024-01-24 ENCOUNTER — OFFICE VISIT (OUTPATIENT)
Dept: ORTHOPEDIC SURGERY | Age: 49
End: 2024-01-24
Payer: COMMERCIAL

## 2024-01-24 VITALS — BODY MASS INDEX: 39.25 KG/M2 | WEIGHT: 187 LBS | HEIGHT: 58 IN

## 2024-01-24 DIAGNOSIS — G89.29 CHRONIC RIGHT SHOULDER PAIN: ICD-10-CM

## 2024-01-24 DIAGNOSIS — M75.111 NONTRAUMATIC INCOMPLETE TEAR OF RIGHT ROTATOR CUFF: ICD-10-CM

## 2024-01-24 DIAGNOSIS — M25.511 CHRONIC RIGHT SHOULDER PAIN: ICD-10-CM

## 2024-01-24 DIAGNOSIS — M54.12 CERVICAL RADICULOPATHY: Primary | ICD-10-CM

## 2024-01-24 PROCEDURE — G8417 CALC BMI ABV UP PARAM F/U: HCPCS | Performed by: FAMILY MEDICINE

## 2024-01-24 PROCEDURE — G8484 FLU IMMUNIZE NO ADMIN: HCPCS | Performed by: FAMILY MEDICINE

## 2024-01-24 PROCEDURE — 99203 OFFICE O/P NEW LOW 30 MIN: CPT | Performed by: FAMILY MEDICINE

## 2024-01-24 PROCEDURE — G8427 DOCREV CUR MEDS BY ELIG CLIN: HCPCS | Performed by: FAMILY MEDICINE

## 2024-01-24 PROCEDURE — 1036F TOBACCO NON-USER: CPT | Performed by: FAMILY MEDICINE

## 2024-01-24 RX ORDER — GABAPENTIN 300 MG/1
300 CAPSULE ORAL NIGHTLY
Qty: 30 CAPSULE | Refills: 0 | Status: SHIPPED | OUTPATIENT
Start: 2024-01-24 | End: 2024-02-23

## 2024-01-24 NOTE — PROGRESS NOTES
nebulization every 6 hours as needed for Wheezing or Shortness of Breath 120 each 3    albuterol sulfate HFA (VENTOLIN HFA) 108 (90 Base) MCG/ACT inhaler Inhale 2 puffs into the lungs every 6 hours as needed for Wheezing 18 g 3    lisinopril (PRINIVIL;ZESTRIL) 20 MG tablet Take 1 tablet by mouth daily 90 tablet 1    pantoprazole (PROTONIX) 40 MG tablet Take 1 tablet by mouth daily 90 tablet 1    SYMBICORT 160-4.5 MCG/ACT AERO INHALE 2 PUFF BY INHALATION ROUTE 2 TIMES EVERY DAY IN THE MORNING AND EVENING 10.2 g 1    vitamin D (ERGOCALCIFEROL) 1.25 MG (07438 UT) CAPS capsule Take 1 capsule by mouth once a week 5 capsule 1    ibuprofen (ADVIL;MOTRIN) 600 MG tablet Take 1 tablet by mouth every 6 hours as needed for Pain 20 tablet 0    Blood Pressure KIT 1 kit by Does not apply route daily 1 kit 0    Misc. Devices (WRIST BRACE) MISC Soft neutral position right wrist brace  Size to fit  Dx:  Wrist pain/carpal tunnel syndrome 1 each 0    Misc. Devices (CERVICAL PILLOW) MISC 1 kit by Does not apply route daily 1 each 0     No current facility-administered medications for this visit.      ______________________________________________________________________    Physical Exam :    Vitals: Ht 1.473 m (4' 10\")   Wt 84.8 kg (187 lb)   BMI 39.08 kg/m²   General Appearance: Nontoxic, awake, alert, and in no acute distress.  Chest wall/Lung: Respirations regular and unlabored. No cyanosis.  Heart: RRR, distal pulses intact.  Neurologic: Alert&Oriented x3. Sensation grossly intact. No focal motor deficits detected.  Musculoskeletal: ROM of the cervical spine grossly normal.  Mild paraspinal muscle tenderness palpation.  Negative Lhermitte's bilaterally.  Negative Spurling's bilaterally.  Negative Starr's bilaterally.  RIGHT Shoulder:  ROM: Limited by pain.  No obvious bony deformity.  No ecchymosis.  TTP: ( - ) AC joint, ( - ) Biceps, ( - ) Coracoid, ( - ) Posterior Joint Line  Impingement Tests: ( + ) Ho, ( + )

## 2024-01-25 ENCOUNTER — TELEPHONE (OUTPATIENT)
Age: 49
End: 2024-01-25

## 2024-01-26 ENCOUNTER — TELEPHONE (OUTPATIENT)
Age: 49
End: 2024-01-26

## 2024-01-26 NOTE — TELEPHONE ENCOUNTER
Patient called asking for Flexeril Prescription but Doctor Arun said no due to Dr. Mendes starting her on Gabapentin and that is stronger and should work better for her.

## 2024-02-09 DIAGNOSIS — I10 PRIMARY HYPERTENSION: ICD-10-CM

## 2024-02-11 DIAGNOSIS — I10 PRIMARY HYPERTENSION: ICD-10-CM

## 2024-02-13 RX ORDER — BLOOD PRESSURE TEST KIT
1 KIT MISCELLANEOUS DAILY
Qty: 1 KIT | Refills: 0 | Status: SHIPPED | OUTPATIENT
Start: 2024-02-13

## 2024-02-13 RX ORDER — LISINOPRIL 20 MG/1
20 TABLET ORAL DAILY
Qty: 90 TABLET | Refills: 1 | Status: SHIPPED | OUTPATIENT
Start: 2024-02-13

## 2024-03-06 ENCOUNTER — COMMUNITY OUTREACH (OUTPATIENT)
Age: 49
End: 2024-03-06

## 2024-03-08 DIAGNOSIS — I10 PRIMARY HYPERTENSION: ICD-10-CM

## 2024-03-08 RX ORDER — LISINOPRIL 20 MG/1
20 TABLET ORAL DAILY
Qty: 90 TABLET | Refills: 1 | Status: SHIPPED | OUTPATIENT
Start: 2024-03-08

## 2024-03-18 DIAGNOSIS — I10 PRIMARY HYPERTENSION: ICD-10-CM

## 2024-03-18 RX ORDER — BLOOD PRESSURE TEST KIT
1 KIT MISCELLANEOUS DAILY
Qty: 1 KIT | Refills: 0 | Status: SHIPPED | OUTPATIENT
Start: 2024-03-18

## 2024-03-22 DIAGNOSIS — E55.9 VITAMIN D DEFICIENCY: ICD-10-CM

## 2024-03-26 RX ORDER — ERGOCALCIFEROL 1.25 MG/1
50000 CAPSULE ORAL WEEKLY
Qty: 5 CAPSULE | Refills: 1 | Status: SHIPPED | OUTPATIENT
Start: 2024-03-26

## 2024-04-14 DIAGNOSIS — I10 PRIMARY HYPERTENSION: ICD-10-CM

## 2024-04-14 DIAGNOSIS — J45.20 MILD INTERMITTENT ASTHMA WITHOUT COMPLICATION: ICD-10-CM

## 2024-04-16 RX ORDER — ALBUTEROL SULFATE 90 UG/1
2 AEROSOL, METERED RESPIRATORY (INHALATION) EVERY 6 HOURS PRN
Qty: 18 G | Refills: 3 | Status: SHIPPED | OUTPATIENT
Start: 2024-04-16

## 2024-04-16 RX ORDER — BLOOD PRESSURE TEST KIT
1 KIT MISCELLANEOUS DAILY
Qty: 1 KIT | Refills: 0 | Status: SHIPPED | OUTPATIENT
Start: 2024-04-16

## 2024-04-17 ENCOUNTER — TELEPHONE (OUTPATIENT)
Age: 49
End: 2024-04-17

## 2024-04-17 ENCOUNTER — OFFICE VISIT (OUTPATIENT)
Age: 49
End: 2024-04-17
Payer: COMMERCIAL

## 2024-04-17 VITALS
BODY MASS INDEX: 38.83 KG/M2 | SYSTOLIC BLOOD PRESSURE: 128 MMHG | DIASTOLIC BLOOD PRESSURE: 86 MMHG | WEIGHT: 185 LBS | HEART RATE: 76 BPM | TEMPERATURE: 97.9 F | HEIGHT: 58 IN | OXYGEN SATURATION: 94 %

## 2024-04-17 DIAGNOSIS — S29.011D STRAIN OF LEFT PECTORALIS MUSCLE, SUBSEQUENT ENCOUNTER: Primary | ICD-10-CM

## 2024-04-17 PROCEDURE — G8417 CALC BMI ABV UP PARAM F/U: HCPCS | Performed by: FAMILY MEDICINE

## 2024-04-17 PROCEDURE — G8427 DOCREV CUR MEDS BY ELIG CLIN: HCPCS | Performed by: FAMILY MEDICINE

## 2024-04-17 PROCEDURE — 99213 OFFICE O/P EST LOW 20 MIN: CPT | Performed by: FAMILY MEDICINE

## 2024-04-17 PROCEDURE — 1036F TOBACCO NON-USER: CPT | Performed by: FAMILY MEDICINE

## 2024-04-17 RX ORDER — LIDOCAINE 4 G/G
1 PATCH TOPICAL DAILY
Qty: 10 EACH | Refills: 0 | Status: SHIPPED | OUTPATIENT
Start: 2024-04-17 | End: 2024-04-27

## 2024-04-17 RX ORDER — CYCLOBENZAPRINE HCL 10 MG
10 TABLET ORAL 3 TIMES DAILY PRN
Qty: 21 TABLET | Refills: 0 | Status: SHIPPED | OUTPATIENT
Start: 2024-04-17 | End: 2024-04-27

## 2024-04-17 RX ORDER — SUMATRIPTAN 25 MG/1
TABLET, FILM COATED ORAL
COMMUNITY
Start: 2023-02-07

## 2024-04-17 ASSESSMENT — ENCOUNTER SYMPTOMS
ABDOMINAL PAIN: 0
SHORTNESS OF BREATH: 0
CONSTIPATION: 0
VOMITING: 0
WHEEZING: 0
COUGH: 0
NAUSEA: 0
DIARRHEA: 0

## 2024-04-17 NOTE — PROGRESS NOTES
[facility] Primary [facility]  DATE OF VISIT : 2024    Patient : Washington Bagley   Age : 48 y.o.    : 1975   MRN : [id number]   ______________________________________________________________________    Chief Complaint :   Chief Complaint   Patient presents with    Follow-up     Patient is here today to follow up with Doctor. States that she needs refills for her Amlodipine, sent in  TE encounter. Using OTC Migraine medication, not really using her Imitrex and Banophen since Tylenol is helping. Had MRI done with Dr. Masters but brought CD and we are unable to read, please advise.    Armpit pain     Left under arm pain present that has not gone away. States she had mammo done and it was normal. Does a lot of heavy lifting at work so might be muscle related.       HPI : Washington Bagley is 48 y.o. female who presented to the clinic today for OV.     Chronic Right shoulder pain: worsening. Decreased ROM. Had previously had shoulder injections x2 about 3yrs ago by ortho. Had Open MRI 24, was given the imaging in CD. Has upcoming appt with Dr. Masters to review MRI.     Left axillary pain: has been ongoing for months. Had mammo done in August which was unremarkable. Pain still comes and goes, it is still reproducible to palpation and worsens with some movements.       I reviewed the patient's past medications, allergies and past medical history during this visit.    Past Medical History :    Past Medical History:   Diagnosis Date    Anxiety     Asthma     Hypertension      Past Surgical History:   Procedure Laterality Date    HYSTERECTOMY, VAGINAL         Social History :  Social History       Tobacco History       Smoking Status  Never      Smokeless Tobacco Use  Never              Alcohol History       Alcohol Use Status  Not Asked              Drug Use       Drug Use Status  Not Asked              Sexual Activity       Sexually Active  Not Asked                     Allergies :

## 2024-04-18 RX ORDER — AMLODIPINE BESYLATE 2.5 MG/1
2.5 TABLET ORAL DAILY
Qty: 90 TABLET | Refills: 1 | Status: SHIPPED | OUTPATIENT
Start: 2024-04-18

## 2024-05-04 NOTE — PROGRESS NOTES
Patient's HM shows they are overdue for Colorectal Screening.   Care Everywhere and  files searched.   updated with 2021 cologuard.        Negative

## 2024-05-07 DIAGNOSIS — I10 PRIMARY HYPERTENSION: ICD-10-CM

## 2024-05-07 DIAGNOSIS — J45.20 MILD INTERMITTENT ASTHMA WITHOUT COMPLICATION: ICD-10-CM

## 2024-05-07 RX ORDER — ALBUTEROL SULFATE 90 UG/1
2 AEROSOL, METERED RESPIRATORY (INHALATION) EVERY 6 HOURS PRN
Qty: 18 G | Refills: 3 | Status: SHIPPED | OUTPATIENT
Start: 2024-05-07

## 2024-05-07 RX ORDER — LISINOPRIL 20 MG/1
20 TABLET ORAL DAILY
Qty: 90 TABLET | Refills: 1 | Status: SHIPPED | OUTPATIENT
Start: 2024-05-07

## 2024-05-11 DIAGNOSIS — I10 PRIMARY HYPERTENSION: ICD-10-CM

## 2024-05-11 DIAGNOSIS — J45.20 MILD INTERMITTENT ASTHMA WITHOUT COMPLICATION: ICD-10-CM

## 2024-05-13 DIAGNOSIS — J45.20 MILD INTERMITTENT ASTHMA WITHOUT COMPLICATION: ICD-10-CM

## 2024-05-13 DIAGNOSIS — I10 PRIMARY HYPERTENSION: ICD-10-CM

## 2024-05-14 RX ORDER — BLOOD PRESSURE TEST KIT
1 KIT MISCELLANEOUS DAILY
Qty: 1 KIT | Refills: 0 | Status: SHIPPED | OUTPATIENT
Start: 2024-05-14

## 2024-05-14 RX ORDER — ALBUTEROL SULFATE 90 UG/1
2 AEROSOL, METERED RESPIRATORY (INHALATION) EVERY 6 HOURS PRN
Qty: 18 G | Refills: 3 | Status: SHIPPED | OUTPATIENT
Start: 2024-05-14

## 2024-05-14 RX ORDER — ALBUTEROL SULFATE 90 UG/1
2 AEROSOL, METERED RESPIRATORY (INHALATION) EVERY 6 HOURS PRN
Qty: 18 G | Refills: 3 | OUTPATIENT
Start: 2024-05-14

## 2024-05-14 RX ORDER — BUDESONIDE AND FORMOTEROL FUMARATE DIHYDRATE 160; 4.5 UG/1; UG/1
AEROSOL RESPIRATORY (INHALATION)
Qty: 10.2 G | Refills: 1 | Status: SHIPPED | OUTPATIENT
Start: 2024-05-14

## 2024-05-14 RX ORDER — AMLODIPINE BESYLATE 2.5 MG/1
2.5 TABLET ORAL DAILY
Qty: 90 TABLET | Refills: 1 | Status: SHIPPED | OUTPATIENT
Start: 2024-05-14

## 2024-05-14 RX ORDER — BLOOD PRESSURE TEST KIT
1 KIT MISCELLANEOUS DAILY
Qty: 1 KIT | Refills: 0 | OUTPATIENT
Start: 2024-05-14

## 2024-05-14 NOTE — TELEPHONE ENCOUNTER
Patient [facility] [address]

## 2024-06-10 DIAGNOSIS — E55.9 VITAMIN D DEFICIENCY: ICD-10-CM

## 2024-06-11 RX ORDER — ERGOCALCIFEROL 1.25 MG/1
50000 CAPSULE ORAL WEEKLY
Qty: 5 CAPSULE | Refills: 1 | Status: SHIPPED | OUTPATIENT
Start: 2024-06-11

## 2024-06-12 ENCOUNTER — OFFICE VISIT (OUTPATIENT)
Age: 49
End: 2024-06-12
Payer: COMMERCIAL

## 2024-06-12 VITALS
WEIGHT: 185.2 LBS | RESPIRATION RATE: 16 BRPM | OXYGEN SATURATION: 98 % | HEIGHT: 58 IN | DIASTOLIC BLOOD PRESSURE: 79 MMHG | SYSTOLIC BLOOD PRESSURE: 132 MMHG | TEMPERATURE: 98.8 F | BODY MASS INDEX: 38.88 KG/M2 | HEART RATE: 78 BPM

## 2024-06-12 DIAGNOSIS — J45.20 MILD INTERMITTENT ASTHMA WITHOUT COMPLICATION: ICD-10-CM

## 2024-06-12 DIAGNOSIS — N76.0 ACUTE VAGINITIS: ICD-10-CM

## 2024-06-12 DIAGNOSIS — N73.6 PELVIC ADHESIONS: ICD-10-CM

## 2024-06-12 DIAGNOSIS — R10.2 PELVIC PAIN: ICD-10-CM

## 2024-06-12 DIAGNOSIS — Z12.12 SCREENING FOR COLORECTAL CANCER: Primary | ICD-10-CM

## 2024-06-12 DIAGNOSIS — I10 PRIMARY HYPERTENSION: ICD-10-CM

## 2024-06-12 DIAGNOSIS — Z12.11 SCREENING FOR COLORECTAL CANCER: Primary | ICD-10-CM

## 2024-06-12 PROCEDURE — G8427 DOCREV CUR MEDS BY ELIG CLIN: HCPCS | Performed by: FAMILY MEDICINE

## 2024-06-12 PROCEDURE — 3075F SYST BP GE 130 - 139MM HG: CPT | Performed by: FAMILY MEDICINE

## 2024-06-12 PROCEDURE — 3078F DIAST BP <80 MM HG: CPT | Performed by: FAMILY MEDICINE

## 2024-06-12 PROCEDURE — G8417 CALC BMI ABV UP PARAM F/U: HCPCS | Performed by: FAMILY MEDICINE

## 2024-06-12 PROCEDURE — 99214 OFFICE O/P EST MOD 30 MIN: CPT | Performed by: FAMILY MEDICINE

## 2024-06-12 PROCEDURE — 1036F TOBACCO NON-USER: CPT | Performed by: FAMILY MEDICINE

## 2024-06-12 RX ORDER — LISINOPRIL 20 MG/1
20 TABLET ORAL DAILY
Qty: 90 TABLET | Refills: 1 | Status: SHIPPED | OUTPATIENT
Start: 2024-06-12

## 2024-06-12 RX ORDER — ALBUTEROL SULFATE 90 UG/1
2 AEROSOL, METERED RESPIRATORY (INHALATION) EVERY 6 HOURS PRN
Qty: 18 G | Refills: 3 | Status: SHIPPED | OUTPATIENT
Start: 2024-06-12

## 2024-06-12 ASSESSMENT — ENCOUNTER SYMPTOMS
NAUSEA: 0
SHORTNESS OF BREATH: 0
WHEEZING: 0
VOMITING: 0
COUGH: 0
ABDOMINAL PAIN: 0
CONSTIPATION: 0
DIARRHEA: 0

## 2024-06-12 NOTE — PROGRESS NOTES
into the lungs every 6 hours as needed for Wheezing 18 g 3    lisinopril (PRINIVIL;ZESTRIL) 20 MG tablet Take 1 tablet by mouth daily 90 tablet 1    vitamin D (ERGOCALCIFEROL) 1.25 MG (04599 UT) CAPS capsule take 1 capsule by mouth every week 5 capsule 1    amLODIPine (NORVASC) 2.5 MG tablet Take 1 tablet by mouth daily 90 tablet 1    Blood Pressure KIT 1 kit by Does not apply route daily 1 kit 0    SYMBICORT 160-4.5 MCG/ACT AERO INHALE 2 PUFF BY INHALATION ROUTE 2 TIMES EVERY DAY IN THE MORNING AND EVENING 10.2 g 1    SUMAtriptan (IMITREX) 25 MG tablet Take by mouth      BANOPHEN 25 MG capsule take 1 capsule by mouth every 6 hours if needed for headache      pantoprazole (PROTONIX) 40 MG tablet Take 1 tablet by mouth daily 90 tablet 1    Misc. Devices (WRIST BRACE) MISC Soft neutral position right wrist brace  Size to fit  Dx:  Wrist pain/carpal tunnel syndrome 1 each 0    Misc. Devices (CERVICAL PILLOW) MISC 1 kit by Does not apply route daily 1 each 0    gabapentin (NEURONTIN) 300 MG capsule Take 1 capsule by mouth nightly for 30 days. Intended supply: 30 days 30 capsule 0    albuterol (PROVENTIL) (2.5 MG/3ML) 0.083% nebulizer solution Take 3 mLs by nebulization every 6 hours as needed for Wheezing or Shortness of Breath 120 each 3    ibuprofen (ADVIL;MOTRIN) 600 MG tablet Take 1 tablet by mouth every 6 hours as needed for Pain 20 tablet 0     No current facility-administered medications for this visit.        Review of Systems :  Review of Systems   Constitutional:  Negative for chills, fatigue and fever.   Respiratory:  Negative for cough, shortness of breath and wheezing.    Cardiovascular:  Negative for chest pain, palpitations and leg swelling.   Gastrointestinal:  Negative for abdominal pain, constipation, diarrhea, nausea and vomiting.   Endocrine: Negative for polydipsia and polyuria.   Genitourinary:  Positive for dyspareunia and pelvic pain. Negative for difficulty urinating, dysuria, flank pain,

## 2024-06-14 LAB
C. TRACHOMATIS DNA ,URINE: NEGATIVE
N. GONORRHOEAE DNA, URINE: NEGATIVE

## 2024-07-18 ENCOUNTER — OFFICE VISIT (OUTPATIENT)
Dept: ORTHOPEDIC SURGERY | Age: 49
End: 2024-07-18
Payer: COMMERCIAL

## 2024-07-18 VITALS — HEIGHT: 58 IN | WEIGHT: 185 LBS | BODY MASS INDEX: 38.83 KG/M2

## 2024-07-18 DIAGNOSIS — M25.511 CHRONIC RIGHT SHOULDER PAIN: Primary | ICD-10-CM

## 2024-07-18 DIAGNOSIS — G89.29 CHRONIC RIGHT SHOULDER PAIN: Primary | ICD-10-CM

## 2024-07-18 PROCEDURE — 20611 DRAIN/INJ JOINT/BURSA W/US: CPT | Performed by: FAMILY MEDICINE

## 2024-07-18 PROCEDURE — G8428 CUR MEDS NOT DOCUMENT: HCPCS | Performed by: FAMILY MEDICINE

## 2024-07-18 PROCEDURE — 1036F TOBACCO NON-USER: CPT | Performed by: FAMILY MEDICINE

## 2024-07-18 PROCEDURE — 96372 THER/PROPH/DIAG INJ SC/IM: CPT | Performed by: FAMILY MEDICINE

## 2024-07-18 PROCEDURE — 99213 OFFICE O/P EST LOW 20 MIN: CPT | Performed by: FAMILY MEDICINE

## 2024-07-18 PROCEDURE — G8417 CALC BMI ABV UP PARAM F/U: HCPCS | Performed by: FAMILY MEDICINE

## 2024-07-18 RX ORDER — BETAMETHASONE SODIUM PHOSPHATE AND BETAMETHASONE ACETATE 3; 3 MG/ML; MG/ML
6 INJECTION, SUSPENSION INTRA-ARTICULAR; INTRALESIONAL; INTRAMUSCULAR; SOFT TISSUE ONCE
Status: COMPLETED | OUTPATIENT
Start: 2024-07-18 | End: 2024-07-18

## 2024-07-18 RX ORDER — LIDOCAINE HYDROCHLORIDE 10 MG/ML
3 INJECTION, SOLUTION INFILTRATION; PERINEURAL ONCE
Status: COMPLETED | OUTPATIENT
Start: 2024-07-18 | End: 2024-07-18

## 2024-07-18 RX ADMIN — LIDOCAINE HYDROCHLORIDE 3 ML: 10 INJECTION, SOLUTION INFILTRATION; PERINEURAL at 16:31

## 2024-07-18 RX ADMIN — BETAMETHASONE SODIUM PHOSPHATE AND BETAMETHASONE ACETATE 6 MG: 3; 3 INJECTION, SUSPENSION INTRA-ARTICULAR; INTRALESIONAL; INTRAMUSCULAR; SOFT TISSUE at 16:31

## 2024-07-18 NOTE — PROGRESS NOTES
[facility]  PRIMARY CARE SPORTS MEDICINE  DATE OF VISIT : 2024    Patient : Washington Bagley  Age : 48 y.o.   : 1975  MRN : [id number]   ______________________________________________________________________    Chief Complaint :   Chief Complaint   Patient presents with    Shoulder Pain     ID # [id number]  ID# [id number] Right shoulder pain.  Patient is in office for MRI review.         HPI : Washington Bagley is 48 y.o. female who presented to the clinic today for follow-up of right shoulder pain. MRI of the right shoulder demonstrates no evidence of complete rotator cuff tear although image quality is limited and partial tears/tendinosis are not completely ruled out.  Symptoms are unchanged since previous visit.    Past Medical History :  Past Medical History:   Diagnosis Date    Anxiety     Asthma     Hypertension      Past Surgical History:   Procedure Laterality Date    HYSTERECTOMY, VAGINAL         Allergies :   Allergies   Allergen Reactions    Shellfish-Derived Products        Medication List :    Current Outpatient Medications   Medication Sig Dispense Refill    diclofenac (VOLTAREN) 50 MG EC tablet Take 1 tablet by mouth 2 times daily (with meals) 60 tablet 0    albuterol sulfate HFA (VENTOLIN HFA) 108 (90 Base) MCG/ACT inhaler Inhale 2 puffs into the lungs every 6 hours as needed for Wheezing 18 g 3    lisinopril (PRINIVIL;ZESTRIL) 20 MG tablet Take 1 tablet by mouth daily 90 tablet 1    vitamin D (ERGOCALCIFEROL) 1.25 MG (95274 UT) CAPS capsule take 1 capsule by mouth every week 5 capsule 1    amLODIPine (NORVASC) 2.5 MG tablet Take 1 tablet by mouth daily 90 tablet 1    Blood Pressure KIT 1 kit by Does not apply route daily 1 kit 0    SYMBICORT 160-4.5 MCG/ACT AERO INHALE 2 PUFF BY INHALATION ROUTE 2 TIMES EVERY DAY IN THE MORNING AND EVENING 10.2 g 1    SUMAtriptan (IMITREX) 25 MG tablet Take by mouth      BANOPHEN 25 MG capsule take 1 capsule by mouth every 6 hours if needed for headache

## 2024-07-24 ENCOUNTER — HOSPITAL ENCOUNTER (OUTPATIENT)
Dept: ULTRASOUND IMAGING | Age: 49
Discharge: HOME | End: 2024-07-26
Payer: COMMERCIAL

## 2024-07-24 PROCEDURE — 76856 US EXAM PELVIC COMPLETE: CPT

## 2024-07-25 ENCOUNTER — OFFICE VISIT (OUTPATIENT)
Age: 49
End: 2024-07-25
Payer: COMMERCIAL

## 2024-07-25 VITALS
OXYGEN SATURATION: 99 % | BODY MASS INDEX: 38.83 KG/M2 | HEIGHT: 58 IN | SYSTOLIC BLOOD PRESSURE: 130 MMHG | HEART RATE: 87 BPM | TEMPERATURE: 97.6 F | RESPIRATION RATE: 16 BRPM | WEIGHT: 185 LBS | DIASTOLIC BLOOD PRESSURE: 70 MMHG

## 2024-07-25 DIAGNOSIS — S29.011A PECTORALIS MUSCLE STRAIN, INITIAL ENCOUNTER: ICD-10-CM

## 2024-07-25 DIAGNOSIS — M79.622 PAIN, AXILLARY, LEFT: ICD-10-CM

## 2024-07-25 DIAGNOSIS — Z12.31 BREAST CANCER SCREENING BY MAMMOGRAM: Primary | ICD-10-CM

## 2024-07-25 DIAGNOSIS — N64.4 BREAST PAIN, LEFT: ICD-10-CM

## 2024-07-25 PROCEDURE — G8427 DOCREV CUR MEDS BY ELIG CLIN: HCPCS | Performed by: FAMILY MEDICINE

## 2024-07-25 PROCEDURE — G8417 CALC BMI ABV UP PARAM F/U: HCPCS | Performed by: FAMILY MEDICINE

## 2024-07-25 PROCEDURE — 99213 OFFICE O/P EST LOW 20 MIN: CPT | Performed by: FAMILY MEDICINE

## 2024-07-25 PROCEDURE — 1036F TOBACCO NON-USER: CPT | Performed by: FAMILY MEDICINE

## 2024-07-25 ASSESSMENT — ENCOUNTER SYMPTOMS
NAUSEA: 0
CONSTIPATION: 0
EYE PAIN: 0
COUGH: 0
VOMITING: 0
DIARRHEA: 0
BLOOD IN STOOL: 0
EYE DISCHARGE: 0
SHORTNESS OF BREATH: 0
ABDOMINAL PAIN: 0

## 2024-07-25 NOTE — PROGRESS NOTES
[facility] Primary Care  DATE OF VISIT : 2024    Patient : Washington Bagley   Age : 48 y.o.    : 1975   MRN : [id number]   ______________________________________________________________________    Chief Complaint :   Chief Complaint   Patient presents with    Arm Pain     C/o left armpit pain and radiates from her armpit area into her left chest/breast area.       HPI : Washington Bagley is 48 y.o. female who presented to the clinic today for OV.     Left breats and axillary pain: US done 23 for same symptoms showed possible reactive Lymph node, also had mammo on 23 which was bill. Continued to have pain but started radiating to the breast Left breast Mammo W/WO CAD done 23 which was normal. Pain has continued and sometimes worsens. Intermittently takes tylenol or ibuprofen with minimal relief.       I reviewed the patient's past medications, allergies and past medical history during this visit.    Past Medical History :      Past Medical History:   Diagnosis Date    Anxiety     Asthma     Hypertension      Past Surgical History:   Procedure Laterality Date    HYSTERECTOMY, VAGINAL         Social History :  Social History       Tobacco History       Smoking Status  Never      Smokeless Tobacco Use  Never              Alcohol History       Alcohol Use Status  Not Asked              Drug Use       Drug Use Status  Not Asked              Sexual Activity       Sexually Active  Not Asked                     Allergies :   Allergies   Allergen Reactions    Shellfish-Derived Products        Medication List :    Current Outpatient Medications   Medication Sig Dispense Refill    diclofenac (VOLTAREN) 50 MG EC tablet Take 1 tablet by mouth 2 times daily (with meals) 60 tablet 0    albuterol sulfate HFA (VENTOLIN HFA) 108 (90 Base) MCG/ACT inhaler Inhale 2 puffs into the lungs every 6 hours as needed for Wheezing 18 g 3    lisinopril (PRINIVIL;ZESTRIL) 20 MG tablet Take 1 tablet by

## 2024-09-12 DIAGNOSIS — J45.20 MILD INTERMITTENT ASTHMA WITHOUT COMPLICATION: ICD-10-CM

## 2024-09-12 DIAGNOSIS — K21.9 GASTROESOPHAGEAL REFLUX DISEASE, UNSPECIFIED WHETHER ESOPHAGITIS PRESENT: ICD-10-CM

## 2024-09-12 DIAGNOSIS — I10 PRIMARY HYPERTENSION: ICD-10-CM

## 2024-09-12 DIAGNOSIS — M54.12 CERVICAL RADICULOPATHY: ICD-10-CM

## 2024-09-12 DIAGNOSIS — M65.4 DE QUERVAIN'S TENOSYNOVITIS, RIGHT: ICD-10-CM

## 2024-09-12 DIAGNOSIS — E55.9 VITAMIN D DEFICIENCY: ICD-10-CM

## 2024-09-12 RX ORDER — BUDESONIDE AND FORMOTEROL FUMARATE DIHYDRATE 160; 4.5 UG/1; UG/1
AEROSOL RESPIRATORY (INHALATION)
Qty: 10.2 G | Refills: 1 | Status: SHIPPED | OUTPATIENT
Start: 2024-09-12

## 2024-09-12 RX ORDER — ALBUTEROL SULFATE 0.83 MG/ML
2.5 SOLUTION RESPIRATORY (INHALATION) EVERY 6 HOURS PRN
Qty: 120 EACH | Refills: 3 | Status: SHIPPED | OUTPATIENT
Start: 2024-09-12 | End: 2024-10-12

## 2024-09-12 RX ORDER — BLOOD PRESSURE TEST KIT
1 KIT MISCELLANEOUS DAILY
Qty: 1 KIT | Refills: 0 | Status: SHIPPED | OUTPATIENT
Start: 2024-09-12

## 2024-09-12 RX ORDER — ERGOCALCIFEROL 1.25 MG/1
50000 CAPSULE, LIQUID FILLED ORAL WEEKLY
Qty: 5 CAPSULE | Refills: 1 | Status: SHIPPED | OUTPATIENT
Start: 2024-09-12

## 2024-09-12 RX ORDER — PANTOPRAZOLE SODIUM 40 MG/1
40 TABLET, DELAYED RELEASE ORAL DAILY
Qty: 90 TABLET | Refills: 1 | Status: SHIPPED | OUTPATIENT
Start: 2024-09-12

## 2024-09-12 RX ORDER — ALBUTEROL SULFATE 90 UG/1
2 AEROSOL, METERED RESPIRATORY (INHALATION) EVERY 6 HOURS PRN
Qty: 18 G | Refills: 3 | Status: SHIPPED | OUTPATIENT
Start: 2024-09-12

## 2024-09-12 RX ORDER — AMLODIPINE BESYLATE 2.5 MG/1
2.5 TABLET ORAL DAILY
Qty: 90 TABLET | Refills: 1 | Status: SHIPPED | OUTPATIENT
Start: 2024-09-12

## 2024-09-12 RX ORDER — CUSHION
1 EACH MISCELLANEOUS DAILY
Qty: 1 EACH | Refills: 0 | Status: SHIPPED | OUTPATIENT
Start: 2024-09-12

## 2024-09-19 DIAGNOSIS — I10 PRIMARY HYPERTENSION: ICD-10-CM

## 2024-09-19 RX ORDER — LISINOPRIL 20 MG/1
20 TABLET ORAL DAILY
Qty: 90 TABLET | Refills: 1 | Status: SHIPPED | OUTPATIENT
Start: 2024-09-19

## 2024-09-20 DIAGNOSIS — I10 PRIMARY HYPERTENSION: ICD-10-CM

## 2024-09-24 RX ORDER — LISINOPRIL 20 MG/1
20 TABLET ORAL DAILY
Qty: 90 TABLET | Refills: 1 | OUTPATIENT
Start: 2024-09-24

## 2024-10-17 ENCOUNTER — OFFICE VISIT (OUTPATIENT)
Dept: OBGYN | Age: 49
End: 2024-10-17
Payer: COMMERCIAL

## 2024-10-17 VITALS
HEIGHT: 58 IN | DIASTOLIC BLOOD PRESSURE: 65 MMHG | BODY MASS INDEX: 38.83 KG/M2 | SYSTOLIC BLOOD PRESSURE: 135 MMHG | HEART RATE: 74 BPM | WEIGHT: 185 LBS

## 2024-10-17 DIAGNOSIS — R10.2 PELVIC PAIN: Primary | ICD-10-CM

## 2024-10-17 PROCEDURE — 1036F TOBACCO NON-USER: CPT | Performed by: OBSTETRICS & GYNECOLOGY

## 2024-10-17 PROCEDURE — G8427 DOCREV CUR MEDS BY ELIG CLIN: HCPCS | Performed by: OBSTETRICS & GYNECOLOGY

## 2024-10-17 PROCEDURE — G8417 CALC BMI ABV UP PARAM F/U: HCPCS | Performed by: OBSTETRICS & GYNECOLOGY

## 2024-10-17 PROCEDURE — 99203 OFFICE O/P NEW LOW 30 MIN: CPT | Performed by: OBSTETRICS & GYNECOLOGY

## 2024-10-17 PROCEDURE — G8484 FLU IMMUNIZE NO ADMIN: HCPCS | Performed by: OBSTETRICS & GYNECOLOGY

## 2024-10-17 RX ORDER — GABAPENTIN 300 MG/1
300 CAPSULE ORAL 2 TIMES DAILY
Qty: 60 CAPSULE | Refills: 1 | Status: SHIPPED | OUTPATIENT
Start: 2024-10-17 | End: 2025-04-15

## 2024-10-17 NOTE — PROGRESS NOTES
New patient alert and pleasant with complaints of abd pain 3 months ago that comes and goes  Here today for annual GYN exam.  Pelvic exam, no specimens obtained.   Breast exam completed by doctor.   Discharge instructions have been discussed with the patient. Patient advised to call our office with any questions or concerns.   Voiced understanding.   Tosha Dorsey  here to assist with this visit

## 2024-10-17 NOTE — PROGRESS NOTES
Washington Nino interpretor used for entire visit.     Patient presents for problem visit.     Patient states she is having pelvic pain. She has the pain constantly. She is unable to have intercourse due to the pain.     Pelvic ultrasound showed surgically absent uterus, normal left ovary, right ovary not visualized.     Patient had a hysterectomy 12 years ago due to adhesions in her uterus. She had pain which led to the surgery.     Patient had a normal pap smear with negative HPV 8/2023. Denies history of abnormal pap smears.     Past Medical History:   Diagnosis Date    Anxiety     Asthma     Hypertension         Past Surgical History:   Procedure Laterality Date    HYSTERECTOMY, VAGINAL          No family history on file.     Social History       Tobacco History       Smoking Status  Never      Smokeless Tobacco Use  Never              Alcohol History       Alcohol Use Status  Not Asked              Drug Use       Drug Use Status  Not Asked              Sexual Activity       Sexually Active  Not Asked                      Current Outpatient Medications:     gabapentin (NEURONTIN) 300 MG capsule, Take 1 capsule by mouth 2 times daily for 180 days. Intended supply: 90 days, Disp: 60 capsule, Rfl: 1    lisinopril (PRINIVIL;ZESTRIL) 20 MG tablet, Take 1 tablet by mouth daily, Disp: 90 tablet, Rfl: 1    albuterol sulfate HFA (VENTOLIN HFA) 108 (90 Base) MCG/ACT inhaler, Inhale 2 puffs into the lungs every 6 hours as needed for Wheezing, Disp: 18 g, Rfl: 3    amLODIPine (NORVASC) 2.5 MG tablet, Take 1 tablet by mouth daily, Disp: 90 tablet, Rfl: 1    pantoprazole (PROTONIX) 40 MG tablet, Take 1 tablet by mouth daily, Disp: 90 tablet, Rfl: 1    SYMBICORT 160-4.5 MCG/ACT AERO, INHALE 2 PUFF BY INHALATION ROUTE 2 TIMES EVERY DAY IN THE MORNING AND EVENING, Disp: 10.2 g, Rfl: 1    vitamin D (ERGOCALCIFEROL) 1.25 MG (24533 UT) CAPS capsule, Take 1 capsule by mouth Once a week at 5 PM, Disp: 5 capsule, Rfl:

## 2024-11-22 DIAGNOSIS — I10 PRIMARY HYPERTENSION: ICD-10-CM

## 2024-11-22 DIAGNOSIS — J45.20 MILD INTERMITTENT ASTHMA WITHOUT COMPLICATION: ICD-10-CM

## 2024-11-22 RX ORDER — ALBUTEROL SULFATE 90 UG/1
2 INHALANT RESPIRATORY (INHALATION) EVERY 6 HOURS PRN
Qty: 18 G | Refills: 3 | Status: SHIPPED | OUTPATIENT
Start: 2024-11-22

## 2024-11-22 RX ORDER — LISINOPRIL 20 MG/1
20 TABLET ORAL DAILY
Qty: 90 TABLET | Refills: 1 | Status: SHIPPED | OUTPATIENT
Start: 2024-11-22

## 2024-12-02 ENCOUNTER — TELEPHONE (OUTPATIENT)
Age: 49
End: 2024-12-02

## 2024-12-02 NOTE — TELEPHONE ENCOUNTER
Patient called saying that it seems like the toilet paper irritated her in her private part and from scratching so much she got a peel on her bean and all the time she takes off her panty if hurts. She wants to know if  can send her a cream to cure it. I told her to go to a walk-in but she don't want she say that she don't like to show her private part to anyone.

## 2024-12-03 ENCOUNTER — TELEPHONE (OUTPATIENT)
Age: 49
End: 2024-12-03

## 2024-12-03 NOTE — TELEPHONE ENCOUNTER
Patient called saying that it seems like the toilet paper irritated her in her private part and from scratching so much she got a peel on her bean and all the time she takes off her panty if hurts. She wants to know if  can send her a cream to cure it. I told her to go to a walk-in but she don't want she say that she don't like to show her private part to anyone